# Patient Record
Sex: MALE | Race: WHITE
[De-identification: names, ages, dates, MRNs, and addresses within clinical notes are randomized per-mention and may not be internally consistent; named-entity substitution may affect disease eponyms.]

---

## 2018-11-17 ENCOUNTER — HOSPITAL ENCOUNTER (EMERGENCY)
Dept: HOSPITAL 97 - ER | Age: 28
Discharge: HOME | End: 2018-11-17
Payer: SELF-PAY

## 2018-11-17 DIAGNOSIS — R07.89: Primary | ICD-10-CM

## 2018-11-17 PROCEDURE — 71046 X-RAY EXAM CHEST 2 VIEWS: CPT

## 2018-11-17 PROCEDURE — 99283 EMERGENCY DEPT VISIT LOW MDM: CPT

## 2018-11-17 NOTE — ER
Nurse's Notes                                                                                     

 [facility]                                                                

Name: Poli Rangel                                                                             

Age: 28 yrs                                                                                       

Sex: Male                                                                                         

: 1990                                                                                   

MRN: [id number]                                                                                   

Arrival Date: 2018                                                                          

Time: 17:26                                                                                       

Account#: [id number]                                                                            

Bed 19                                                                                            

Private MD:                                                                                       

Diagnosis: Other chest pain-Right Lateral Chest Wall                                              

                                                                                                  

Presentation:                                                                                     

                                                                                             

17:48 Presenting complaint: Patient states: 5 days ago my right side rib was hurting and now  la1 

      this thing popped up and it hurts to touch or lay on. Transition of care: patient was       

      not received from another setting of care. Onset of symptoms was 2018.         

      Risk Assessment: Do you want to hurt yourself or someone else? Patient reports no           

      desire to harm self or others. Initial Sepsis Screen: Does the patient meet any 2           

      criteria? No. Patient's initial sepsis screen is negative. Does the patient have a          

      suspected source of infection? No. Patient's initial sepsis screen is negative. Care        

      prior to arrival: None.                                                                     

17:48 Method Of Arrival: Ambulatory                                                           la1 

17:48 Acuity: KAMERON 3                                                                           la1 

                                                                                                  

Historical:                                                                                       

- Allergies:                                                                                      

17:48 No Known Allergies;                                                                     la1 

- PMHx:                                                                                           

17:48 None;                                                                                   la1 

                                                                                                  

- Immunization history:: Adult Immunizations up to date.                                          

- Social history:: Smoking status: Patient/guardian denies using tobacco.                         

- Ebola Screening: : No symptoms or risks identified at this time.                                

                                                                                                  

                                                                                                  

Screenin:53 Abuse screen: Denies threats or abuse. Nutritional screening: No deficits noted.        em  

      Tuberculosis screening: No symptoms or risk factors identified. Fall Risk None              

      identified.                                                                                 

                                                                                                  

Assessment:                                                                                       

17:53 General: Appears in no apparent distress. comfortable, Behavior is calm, cooperative,   em  

      appropriate for age. Pain: Complains of pain in right lateral anterior chest Pain           

      currently is 7 out of 10 on a pain scale. at worst was 10 out of 10 on a pain scale.        

      Neuro: Level of Consciousness is awake, alert, obeys commands, Oriented to person,          

      place, time, situation. Cardiovascular: Capillary refill < 3 seconds Patient's skin is      

      warm and dry. Respiratory: Airway is patent Respiratory effort is even, unlabored,          

      Respiratory pattern is regular, symmetrical, Breath sounds are clear bilaterally.           

      tenderness to right lateral chest Denies cough, shortness of breath pain with               

      respiration. GI: Abdomen is flat, Abd is soft and non tender X 4 quads. : No signs        

      and/or symptoms were reported regarding the genitourinary system. EENT: No signs and/or     

      symptoms were reported regarding the EENT system. Derm: Skin is intact, Skin is pink,       

      warm \T\ dry. Musculoskeletal: Range of motion: intact in all extremities.                  

17:53 Reassessment: I agree with assessment completed by Feroz Del Rio LVN .                   aa5 

18:58 Reassessment: Patient appears in no apparent distress at this time. pending shot time,  em  

      will be discharged afterwards.                                                              

                                                                                                  

Vital Signs:                                                                                      

17:46  / 62; Pulse 73; Resp 18; Temp 97.2; Pulse Ox 100% on R/A; Weight 90.72 kg;       la1 

      Height 6 ft. 1 in. (185.42 cm);                                                             

18:58  / 68; Pulse 69; Resp 20; Pulse Ox 99% on R/A; Pain 7/10;                         em  

17:46 Body Mass Index 26.39 (90.72 kg, 185.42 cm)                                             la1 

                                                                                                  

ED Course:                                                                                        

17:26 Patient arrived in ED.                                                                  tw3 

17:46 Arm band placed on right wrist.                                                         la1 

17:48 Triage completed.                                                                       la1 

17:53 Feroz Del Rio LVN is Primary Nurse.                                                     em  

17:53 Patient has correct armband on for positive identification. Placed in gown. Bed in low  em  

      position. Call light in reach. Adult w/ patient.                                            

17:55 Preston Ledesma PA is PHCP.                                                                cp  

17:55 Larry Allen MD is Attending Physician.                                              cp  

18:33 XRAY Chest Pa And Lat (2 Views) In Process Unspecified.                                 EDMS

18:59 No provider procedures requiring assistance completed. Patient did not have IV access   em  

      during this emergency room visit.                                                           

                                                                                                  

Administered Medications:                                                                         

No medications were administered                                                                  

                                                                                                  

                                                                                                  

Outcome:                                                                                          

18:51 Discharge ordered by MD.                                                                cp  

18:59 Discharged to home ambulatory, with family.                                             em  

18:59 Condition: good                                                                             

18:59 Discharge instructions given to patient, Instructed on discharge instructions, follow       

      up and referral plans. medication usage, Demonstrated understanding of instructions,        

      follow-up care, medications, Prescriptions given X 1.                                       

19:11 Patient left the ED.                                                                    ak1 

                                                                                                  

Signatures:                                                                                       

Dispatcher [employer]                           EDMS                                                 

Feroz Del Rio LVN LVN  em                                                   

Mary Bhardwaj RN                     RN   aa5                                                  

Attema, Gilbert, RN                         RN   la1                                                  

Liset Webb RN                       RN   ak1                                                  

Preston Ledesma PA PA cp Wade, Tia                                    tw3                                                  

                                                                                                  

Corrections: (The following items were deleted from the chart)                                    

18:59 18:58  / 68; Pulse 19bpm; Resp 20bpm; Pulse Ox 99% RA; Pain 7/10; em              em  

                                                                                                  

**************************************************************************************************

## 2018-11-17 NOTE — RAD REPORT
EXAM DESCRIPTION:  RAD - Chest Pa And Lat (2 Views) - 11/17/2018 6:32 pm

 

CLINICAL HISTORY:  CHEST PAIN

Chest pain.

 

COMPARISON:  No comparisons

 

FINDINGS:  The lungs are clear. The heart is normal in size. No displaced fractures.

 

IMPRESSION:  No acute or concerning finding suspected.

## 2018-11-17 NOTE — EDPHYS
Physician Documentation                                                                           

 [facility]                                                                

Name: Poli Rangel                                                                             

Age: 28 yrs                                                                                       

Sex: Male                                                                                         

: 1990                                                                                   

MRN: [id number]                                                                                   

Arrival Date: 2018                                                                          

Time: 17:26                                                                                       

Account#: [id number]                                                                            

Bed 19                                                                                            

Private MD:                                                                                       

ED Physician Larry Allen                                                                       

HPI:                                                                                              

                                                                                             

18:15 This 28 yrs old  Male presents to ER via Ambulatory with complaints of Side    cp  

      Pain.                                                                                       

18:15 The patient or guardian reports chest pain that is located primarily in the anterior    cp  

      chest wall, right.                                                                          

18:15 The pain does not radiate. Associated signs and symptoms: Pertinent negatives:          cp  

      abdominal pain, cough, diaphoresis, lower extremity pain, lower extremity swelling,         

      recent travel, shortness of breath, syncope, vomiting.                                      

                                                                                                  

Historical:                                                                                       

- Allergies:                                                                                      

17:48 No Known Allergies;                                                                     la1 

- PMHx:                                                                                           

17:48 None;                                                                                   la1 

                                                                                                  

- Immunization history:: Adult Immunizations up to date.                                          

- Social history:: Smoking status: Patient/guardian denies using tobacco.                         

- Ebola Screening: : No symptoms or risks identified at this time.                                

                                                                                                  

                                                                                                  

ROS:                                                                                              

18:20 Constitutional: Negative for body aches, chills, fever, poor PO intake.                 cp  

18:20 Eyes: Negative for injury, pain, redness, and discharge.                                cp  

18:20 ENT: Negative for drainage from ear(s), ear pain, rhinorrhea, sore throat, difficulty       

      swallowing, difficulty handling secretions, hoarseness.                                     

18:20 Cardiovascular: Positive for chest pain, of the right lateral anterior chest wall,          

      Negative for edema, palpitations.                                                           

18:20 Respiratory: Negative for cough, dyspnea on exertion, shortness of breath, wheezing.        

18:20 Back: Negative for pain at rest, pain with movement, radiated pain.                         

18:20 : Negative for urinary symptoms.                                                          

18:20 Skin: Negative for cellulitis, rash.                                                        

18:20 Neuro: Negative for altered mental status, headache, weakness.                              

18:20 All other systems are negative.                                                             

                                                                                                  

Exam:                                                                                             

18:25 Constitutional: The patient appears in no acute distress, alert, awake,                 cp  

      non-diaphoretic, non-toxic, well developed, well nourished.                                 

18:25 Head/Face:  Normocephalic, atraumatic. Eyes:  Pupils equal round and reactive to light, cp  

      extra-ocular motions intact.  Lids and lashes normal.  Conjunctiva and sclera are           

      non-icteric and not injected.  Cornea within normal limits.  Periorbital areas with no      

      swelling, redness, or edema. ENT:  Nares patent. No nasal discharge, no septal              

      abnormalities noted.  Tympanic membranes are normal and external auditory canals are        

      clear.  Oropharynx with no redness, swelling, or masses, exudates, or evidence of           

      obstruction, uvula midline.  Mucous membranes moist.                                        

18:25 Neck: External neck: is normal, ROM/movement: is normal, is supple, without pain, no        

      range of motions limitations, no nuchal rigidity, Lymph nodes: no appreciated               

      lymphadenopathy.                                                                            

18:25 Chest/axilla: Inspection: normal, Palpation: crepitus, is not appreciated, tenderness,      

      that is moderate, of the  right lateral anterior chest, that totally reproduces the         

      patient's complaints.                                                                       

18:25 Cardiovascular: Rate: normal, Rhythm: regular, Pulses: Pulses are 2+ in right radial        

      artery and left radial artery. Edema: is not appreciated, JVD: is not appreciated.          

18:25 Respiratory: the patient does not display signs of respiratory distress,  Respirations:     

      normal, no use of accessory muscles, no retractions, no splinting, no tachypnea,            

      labored breathing, is not present, Breath sounds: are clear throughout, no decreased        

      breath sounds, no stridor, no wheezing.                                                     

18:25 Abdomen/GI: Inspection: abdomen appears normal, Palpation: abdomen is soft and              

      non-tender, in all quadrants.                                                               

18:25 Back: pain, is absent, ROM is normal.                                                       

18:25 Skin: cellulitis, is not appreciated, no rash present.                                      

18:25 Neuro: Orientation: is normal, Mentation: is normal, Cerebellar function: is grossly        

      normal, Motor: is normal, Gait: is steady.                                                  

                                                                                                  

Vital Signs:                                                                                      

17:46  / 62; Pulse 73; Resp 18; Temp 97.2; Pulse Ox 100% on R/A; Weight 90.72 kg;       la1 

      Height 6 ft. 1 in. (185.42 cm);                                                             

18:58  / 68; Pulse 69; Resp 20; Pulse Ox 99% on R/A; Pain 7/10;                         em  

17:46 Body Mass Index 26.39 (90.72 kg, 185.42 cm)                                             la1 

                                                                                                  

MDM:                                                                                              

17:55 Patient medically screened.                                                             cp  

18:00 Differential diagnosis: acute pericarditis, chest wall pain, pericarditis, pleurisy,    cp  

      pneumonia, pneumothorax, costochondritis.                                                   

18:50 Data reviewed: vital signs, nurses notes, radiologic studies, plain films.              cp  

18:50 Test interpretation: by ED physician or midlevel provider: plain radiologic studies.    cp  

      Counseling: I had a detailed discussion with the patient and/or guardian regarding: the     

      historical points, exam findings, and any diagnostic results supporting the                 

      discharge/admit diagnosis, radiology results, to return to the emergency department if      

      symptoms worsen or persist or if there are any questions or concerns that arise at          

      home. Response to treatment: the patient's symptoms have mildly improved after              

      treatment, and as a result, I will discharge patient.                                       

                                                                                                  

                                                                                             

18:07 Order name: XRAY Chest Pa And Lat (2 Views); Complete Time: 18:49                       cp  

                                                                                             

18:49 Interpretation: Report reviewed.                                                        cp  

                                                                                                  

Administered Medications:                                                                         

No medications were administered                                                                  

                                                                                                  

                                                                                                  

Disposition:                                                                                      

                                                                                             

09:53 Co-signature as Attending Physician, Larry Allen MD.                                    

                                                                                                  

Disposition:                                                                                      

18 18:51 Discharged to Home. Impression: Other chest pain - Right Lateral Chest Wall.       

- Condition is Stable.                                                                            

- Discharge Instructions: Chest Wall Pain.                                                        

- Prescriptions for ketorolac 10 mg Oral tablet - take 1 tablet by ORAL route every 6             

  hours As needed not to exceed 40 mg in 24hrs; 15 tablet.                                        

- Medication Reconciliation Form, Thank You Letter, Antibiotic Education, Prescription            

  Opioid Use form.                                                                                

- Follow up: Private Physician; When: 2 - 3 days; Reason: Recheck today's complaints.             

- Problem is new.                                                                                 

- Symptoms are unchanged.                                                                         

                                                                                                  

                                                                                                  

                                                                                                  

Signatures:                                                                                       

Dispatcher [employer]                           EDMS                                                 

Gilbert Estrella RN                         RN   la1                                                  

Liset Webb RN                       RN   ak1                                                  

Preston Ledesma PA PA cp Starr, Gregory, MD MD gs                                                   

                                                                                                  

Corrections: (The following items were deleted from the chart)                                    

                                                                                             

19:11 18:51 2018 18:51 Discharged to Home. Impression: Other chest pain - Right Lateral ak1 

      Chest Wall. Condition is Stable. Forms are Medication Reconciliation Form, Thank You        

      Letter, Antibiotic Education, Prescription Opioid Use. Follow up: Private Physician;        

      When: 2 - 3 days; Reason: Recheck today's complaints. Problem is new. Symptoms are          

      unchanged. cp                                                                               

                                                                                                  

**************************************************************************************************

## 2020-09-17 ENCOUNTER — HOSPITAL ENCOUNTER (EMERGENCY)
Dept: HOSPITAL 97 - ER | Age: 30
Discharge: HOME | End: 2020-09-17
Payer: SELF-PAY

## 2020-09-17 VITALS — SYSTOLIC BLOOD PRESSURE: 118 MMHG | DIASTOLIC BLOOD PRESSURE: 68 MMHG | OXYGEN SATURATION: 99 %

## 2020-09-17 VITALS — TEMPERATURE: 98.9 F
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
## 2020-09-17 DIAGNOSIS — Z23: ICD-10-CM

## 2020-09-17 DIAGNOSIS — S61.210A: Primary | ICD-10-CM
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
## 2020-09-17 PROCEDURE — 90471 IMMUNIZATION ADMIN: CPT

## 2020-09-17 PROCEDURE — 0JQJ0ZZ REPAIR RIGHT HAND SUBCUTANEOUS TISSUE AND FASCIA, OPEN APPROACH: ICD-10-PCS

## 2020-09-17 PROCEDURE — 90714 TD VACC NO PRESV 7 YRS+ IM: CPT

## 2020-09-17 PROCEDURE — 99283 EMERGENCY DEPT VISIT LOW MDM: CPT

## 2020-09-17 PROCEDURE — 64450 NJX AA&/STRD OTHER PN/BRANCH: CPT

## 2020-09-17 NOTE — XMS REPORT
Continuity of Care Document

                          Created on:2020



Patient:TERESA ROBLERO

Sex:Male

:1990

External Reference #:[id number]





Demographics







                          Address                   [address]



                                                    [place] [address]

 

                          Home Phone                [phone]

 

                          Work Phone                [phone]

 

                          Mobile Phone              [phone]

 

                          Email Address             DECL[place]

 

                          Preferred Language        English

 

                          Marital Status            Unknown

 

                          [employer] Affiliation     Unknown

 

                          Race                      Unknown

 

                          Additional Race(s)        Unavailable



                                                    White

 

                          Ethnic Group              Not  or 









Author







                          Organization              [facility]

t

 

                          Address                   [address]



                                                    [place] [address]

 

                          Phone                     [phone]









Support







                Name            Relationship    Address         Phone

 

                Unknown         Other           [phone]     [phone]



                                                [place] [address] 

 

                Juan Carlos         Mother          [phone]     [phone]



                                                [place] [address] 









Care Team Providers







                    Name                Role                Phone

 

                    Deloris ANDERS             Attending Clinician [phone]

 

                    Oswaldo RN,  A    Attending Clinician Unavailable

 

                    Doctor Unassigned,  Name Attending Clinician Unavailable

 

                    Deloris ANDERS             Admitting Clinician [phone]









Problems

This patient has no known problems.



Allergies, Adverse Reactions, Alerts

This patient has no known allergies or adverse reactions.



Medications

This patient has no known medications.



Procedures

This patient has no known procedures.



Encounters







        Start   End     Encounter Admission Attending Care    Care    Encounter 

Source



        Date/Time Date/Time Type    Type    Clinicians Facility Department ID   

   

 

        2019 [facility]         Saji Puentes [facility]    1.2.840.114 [id number]

[id number] 



        06:04:09 11:49:00 Encounter                 Health  350.1.13.10         



                                                Clear   4.2.7.2.686         



                                                [place]    [phone]         



                                                [facility] 049             



                                                ([facility])                   

 

        2019 Anesthesia         [facility]    [id number] 

[id number] 



        08:04:00 10:10:00                 [facility]  350.1.13.10         



                                                Clear   4.2.7.2.686         



                                                [place] [phone]         



                                                [facility] 020             



                                                ([facility])                   

 

        2019 Orders          Doctor SARAVIA    1.2.840.114 [id number]

[id number] 



        00:00:00 00:00:00 Only            UnassignedBOINTA   350.1.13.10       

  



                                        [facility] 4.2.7.2.686         



                                                        [phone]         



                                                        009             

 

        2019 Orders          Doctor  RANI    1.2.840.114 [id number]

[id number] 



        00:00:00 00:00:00 Only            Unassigned, BONITA   350.1.13.10       

  



                                        [facility] 4.2.7.2.686         



                                                        [phone]         



                                                        009             

 

        2019 Office          Saji Puentes [facility]    1.2.840.114 [id number]

[id number] 



        13:44:41 14:14:41 Visit                   Health  350.1.13.10         



                                                Clear   4.2.7.2.686         



                                                [place]    [phone]         



                                                [facility]             



                                                Office                  



                                                Building                 







Results

This patient has no known results.

## 2020-09-17 NOTE — ER
Nurse's Notes                                                                                     

 [facility]                                                                 

Name: Poli Rangel                                                                             

Age: 29 yrs                                                                                       

Sex: Male                                                                                         

: 1990                                                                                   

MRN: [id number]                                                                                   

Arrival Date: 2020                                                                          

Time: 10:17                                                                                       

Account#: [id number]                                                                            

Bed 7                                                                                             

Private MD: Nathaniel Hearn                                                                         

Diagnosis: Laceration without foreign body of right index finger without damage to nail           

                                                                                                  

Presentation:                                                                                     

                                                                                             

10:30 Chief complaint: Patient states: sharpening shovel and with file and finger got caught  em  

      on it lac. noted to the right index finger about inch. Coronavirus screen: Client           

      denies travel out of the [place] in the last 14 days. Ebola Screen: Patient negative for       

      fever greater than or equal to 101.5 degrees Fahrenheit, and additional compatible          

      Ebola Virus Disease symptoms Patient denies exposure to infectious person. Patient          

      denies travel to an Ebola-affected area in the 21 days before illness onset. No             

      symptoms or risks identified at this time. Initial Sepsis Screen: Does the patient meet     

      any 2 criteria? No. Patient's initial sepsis screen is negative. Does the patient have      

      a suspected source of infection? Yes: Skin breakdown/wound. Risk Assessment: Do you         

      want to hurt yourself or someone else? Patient reports no desire to harm self or            

      others. Onset of symptoms was 2020.                                           

10:30 Method Of Arrival: Ambulatory                                                           em  

10:30 Acuity: KAMERON 4                                                                           em  

                                                                                                  

Historical:                                                                                       

- Allergies:                                                                                      

10:33 No Known Allergies;                                                                     em  

- Home Meds:                                                                                      

10:33 None [Active];                                                                          em  

- PMHx:                                                                                           

10:33 None;                                                                                   em  

- PSHx:                                                                                           

10:33 back surgery;                                                                           em  

                                                                                                  

- Immunization history:: Adult Immunizations unknown.                                             

- Social history:: Smoking status: Patient denies any tobacco usage or history of.                

                                                                                                  

                                                                                                  

Screening:                                                                                        

10:30 Abuse screen: Denies threats or abuse. Nutritional screening: No deficits noted.        em  

      Tuberculosis screening: No symptoms or risk factors identified. Fall Risk None              

      identified.                                                                                 

                                                                                                  

Assessment:                                                                                       

10:49 General: Appears in no apparent distress. comfortable, Behavior is calm, cooperative,   ca1 

      appropriate for age. Pain: Complains of pain in dorsal aspect of middle phalanx of          

      right index finger. Neuro: Level of Consciousness is awake, alert, obeys commands,          

      Oriented to person, place, time, situation. Derm: Skin is healthy with good turgor,         

      Skin is pink, warm \T\ dry. Musculoskeletal: Circulation, motion, and sensation intact.     

      Capillary refill < 3 seconds. Injury Description: Laceration sustained to dorsal aspect     

      of middle phalanx of right index finger is clean, was sustained 30-60 minutes ago. a        

      small amount of bleeding noted at this time.                                                

11:24 Reassessment: Patient appears in no apparent distress at this time. Patient is alert,   ca1 

      oriented x 3, equal unlabored respirations, skin warm/dry/pink.                             

11:31 Reassessment: Daisha TOTH at bedside suturing.                                           sv  

                                                                                                  

Vital Signs:                                                                                      

10:30  / 73; Pulse 56; Resp 18; Temp 98.9; Pulse Ox 100% on R/A; Weight 88.45 kg;       em  

      Height 6 ft. 1 in. (185.42 cm); Pain 8/10;                                                  

11:50  / 68; Pulse 62; Resp 18; Pulse Ox 99% on R/A;                                    em  

10:30 Body Mass Index 25.73 (88.45 kg, 185.42 cm)                                             em  

                                                                                                  

ED Course:                                                                                        

10:17 Patient arrived in ED.                                                                  mr  

10:17 Nathaniel Hearn MD is Private Physician.                                                 mr  

10:17 [facility]C [facility]                                                        kb  

10:17 Av Pappas MD is Attending Physician.                                              kb  

10:30 Patient has correct armband on for positive identification. Bed in low position. Call   em  

      light in reach.                                                                             

10:33 Triage completed.                                                                       em  

10:33 Arm band placed on.                                                                     em  

10:40 Ghazala Barriga, RN is Primary Nurse.                                                      ca1 

10:49 Pulse ox on. NIBP on.                                                                   ca1 

10:49 Patient did not have IV access during this emergency room visit.                        ca1 

10:50 Assist provider with laceration repair on dorsal aspect of middle phalanx of right      em  

      index finger that was between 2.6 to 7.5 cm using sutures. Set up tray. Performed by        

      Daisha CALDWELL Dressed with 4X4s, Kerlix, Neosporin, Patient tolerated well.         

                                                                                                  

Administered Medications:                                                                         

10:44 Drug: Tetanus-Diphtheria Toxoid Adult 0.5 ml {: [place]. Exp:         em  

      2023. Lot #: [id number]. } Route: IM; Site: left deltoid;                                  

12:02 Follow up: Response: No adverse reaction                                                em  

10:48 Drug: Lidocaine (1 %) 1 vials {Note: by SIMI Ashton} Volume: 5 ml; Route: Infiltration;ca1 

10:49 Drug: Bupivacaine (0.5 %) 1 vials {Note: by SIMI Ashton} Volume: 10 ml; Route:         ca1 

      Infiltration;                                                                               

                                                                                                  

                                                                                                  

Outcome:                                                                                          

11:43 Discharge ordered by MD. ospina  

12:01 Discharged to home ambulatory.                                                          em  

12:01 Condition: good                                                                             

12:01 Discharge instructions given to patient, Instructed on discharge instructions, follow       

      up and referral plans. wound care, Demonstrated understanding of instructions,              

      follow-up care, wound care.                                                                 

12:03 Patient left the ED.                                                                    em  

                                                                                                  

Signatures:                                                                                       

Daisha Lemon, FNP-C                 FNP-Mariann Acevedo, RN                    Mary Sultana Edgar, RN                        RN                                                      

Ghazala Barriga RN                        RN   ca1                                                  

                                                                                                  

**************************************************************************************************

## 2020-09-17 NOTE — EDPHYS
Physician Documentation                                                                           

 [facility]                                                                 

Name: Poli Rangel                                                                             

Age: 29 yrs                                                                                       

Sex: Male                                                                                         

: 1990                                                                                   

MRN: [id number]                                                                                   

Arrival Date: 2020                                                                          

Time: 10:17                                                                                       

Account#: [id number]                                                                            

Bed 7                                                                                             

Private MD: Nathaniel Hearn                                                                         

ED Physician Av Pappas                                                                       

HPI:                                                                                              

                                                                                             

10:46 This 29 yrs old  Male presents to ER via Ambulatory with complaints of Finger  kb  

      laceration.                                                                                 

10:56 The patient has a laceration related to: doing yard work, sharpening shovel occurred    kb  

      outdoors, and there are no complicating factors. The injury was accidental. The             

      laceration(s) is(are) located on the dorsal aspect of proximal phalanx of right index       

      finger. Onset: The symptoms/episode began/occurred just prior to arrival. Associated        

      signs and symptoms: The patient has no apparent associated signs or symptoms. The           

      patient has not experienced similar symptoms in the past. The patient has not recently      

      seen a physician.                                                                           

                                                                                                  

Historical:                                                                                       

- Allergies:                                                                                      

10:33 No Known Allergies;                                                                     em  

- Home Meds:                                                                                      

10:33 None [Active];                                                                          em  

- PMHx:                                                                                           

10:33 None;                                                                                   em  

- PSHx:                                                                                           

10:33 back surgery;                                                                           em  

                                                                                                  

- Immunization history:: Adult Immunizations unknown.                                             

- Social history:: Smoking status: Patient denies any tobacco usage or history of.                

                                                                                                  

                                                                                                  

ROS:                                                                                              

10:45 Constitutional: Negative for fever, chills, and weight loss, Cardiovascular: Negative   kb  

      for chest pain, palpitations, and edema, Respiratory: Negative for shortness of breath,     

      cough, wheezing, and pleuritic chest pain, Abdomen/GI: Negative for abdominal pain,         

      nausea, vomiting, diarrhea, and constipation, Back: Negative for injury and pain,           

      MS/Extremity: Negative for injury and deformity, Neuro: Negative for headache,              

      weakness, numbness, tingling, and seizure.                                                  

10:45 Skin: Positive for laceration(s), of the dorsal aspect of middle phalanx of right index     

      finger.                                                                                     

                                                                                                  

Exam:                                                                                             

10:53 Constitutional:  This is a well developed, well nourished patient who is awake, alert,  kb  

      and in no acute distress. Head/Face:  Normocephalic, atraumatic. Chest/axilla:  Normal      

      chest wall appearance and motion.  Nontender with no deformity.  No lesions are             

      appreciated. Cardiovascular:  Regular rate and rhythm with a normal S1 and S2.  No          

      gallops, murmurs, or rubs.  Normal PMI, no JVD.  No pulse deficits. Respiratory:  Lungs     

      have equal breath sounds bilaterally, clear to auscultation and percussion.  No rales,      

      rhonchi or wheezes noted.  No increased work of breathing, no retractions or nasal          

      flaring. Abdomen/GI:  Soft, non-tender, with normal bowel sounds.  No distension or         

      tympany.  No guarding or rebound.  No evidence of tenderness throughout. MS/ Extremity:     

       Pulses equal, no cyanosis.  Neurovascular intact.  Full, normal range of motion.           

      Neuro:  Awake and alert, GCS 15, oriented to person, place, time, and situation.            

      Cranial nerves II-XII grossly intact.  Motor strength 5/5 in all extremities.  Sensory      

      grossly intact.  Cerebellar exam normal.  Normal gait.                                      

10:53 Skin: injury, laceration(s), the wound is approximately  2 cm(s), of the dorsal aspect      

      of proximal phalanx of right index finger, that can be described as clean, no foreign       

      body, linear, without bleeding.                                                             

                                                                                                  

Vital Signs:                                                                                      

10:30  / 73; Pulse 56; Resp 18; Temp 98.9; Pulse Ox 100% on R/A; Weight 88.45 kg;       em  

      Height 6 ft. 1 in. (185.42 cm); Pain 8/10;                                                  

11:50  / 68; Pulse 62; Resp 18; Pulse Ox 99% on R/A;                                    em  

10:30 Body Mass Index 25.73 (88.45 kg, 185.42 cm)                                             em  

                                                                                                  

Procedures:                                                                                       

10:53 Nerve block: (digital) of dorsal aspect of proximal phalanx of right index finger       kb  

      Medication: Lidocaine 1% without epinephrine Marcaine 0.5%, Amount: 5 mls were              

      injected, Effect: the patient has resolution of the pain, Set up for procedure.             

      Performed by Daisha CALDWELL Patient tolerated well.                                  

                                                                                                  

Laceration:                                                                                       

11:42 Wound Repair of 2cm ( 0.8in ) subcutaneous laceration to dorsal aspect of proximal      kb  

      phalanx of right index finger. Linear shaped.. Distal neuro/vascular/tendon intact.         

      Anesthesia: Digital block administered with 1% lidocaine. Wound prep: Extensive             

      cleansing with hibiclenz by me, Wound irrigation with saline by me. Skin closed with 4      

      5-0 Prolene using simple sutures and sterile technique. Patient tolerated well.             

                                                                                                  

MDM:                                                                                              

10:36 Patient medically screened.                                                             kb  

10:46 Data reviewed: vital signs, nurses notes. Data interpreted: Pulse oximetry: on room air kb  

      is 100 %. Interpretation: normal. Counseling: I had a detailed discussion with the          

      patient and/or guardian regarding: the historical points, exam findings, and any            

      diagnostic results supporting the discharge/admit diagnosis, the need for outpatient        

      follow up, a family practitioner, to return to the emergency department if symptoms         

      worsen or persist or if there are any questions or concerns that arise at home.             

                                                                                                  

                                                                                             

10:40 Order name: Prolene, Sutures; Complete Time: 10:49                                      kb  

                                                                                             

10:40 Order name: Dressing - Wound; Complete Time: 11:59                                      kb  

                                                                                             

10:40 Order name: Gloves, Sterile; Complete Time: 10:40                                       kb  

                                                                                             

10:40 Order name: Setup Suture Tray; Complete Time: 10:40                                     kb  

                                                                                             

11:42 Order name: Finger Splint; Complete Time: 11:59                                         kb  

                                                                                                  

Administered Medications:                                                                         

10:44 Drug: Tetanus-Diphtheria Toxoid Adult 0.5 ml {: [employer]. Exp:         em  

      2023. Lot #: [id number]. } Route: IM; Site: left deltoid;                                  

12:02 Follow up: Response: No adverse reaction                                                em  

10:48 Drug: Lidocaine (1 %) 1 vials {Note: by JANEY Ashton.} Volume: 5 ml; Route: Infiltration;ca1 

10:49 Drug: Bupivacaine (0.5 %) 1 vials {Note: by Daisha NP.} Volume: 10 ml; Route:         ca1 

      Infiltration;                                                                               

                                                                                                  

                                                                                                  

Disposition:                                                                                      

12:31 Co-signature as Attending Physician, Av Pappas MD I agree with the assessment and   kdr 

      plan of care.                                                                               

                                                                                                  

Disposition:                                                                                      

20 11:43 Discharged to Home. Impression: Laceration without foreign body of right index     

  finger without damage to nail.                                                                  

- Condition is Stable.                                                                            

- Discharge Instructions: Laceration Care, Adult, Easy-to-Read.                                   

                                                                                                  

- Medication Reconciliation Form, Thank You Letter, Antibiotic Education, Prescription            

  Opioid Use form.                                                                                

- Follow up: Emergency Department; When: As needed; Reason: Worsening of condition.               

  Follow up: Private Physician; When: 2 - 3 days; Reason: Recheck today's complaints,             

  Continuance of care, Re-evaluation by your physician.                                           

- Notes: Have sutures removed in 10-14 days Keep clean and dry                                    

                                                                                                  

                                                                                                  

Signatures:                                                                                       

Daisha Lemon, JANESC                 FNP-Av Robledo MD MD kdr Munoz, Edgar, RN                        RN   em                                                   

Ghazala Barriga RN                        RN   ca1                                                  

                                                                                                  

Corrections: (The following items were deleted from the chart)                                    

12:03 11:43 2020 11:43 Discharged to Home. Impression: Laceration without foreign body  em  

      of right index finger without damage to nail. Condition is Stable. Forms are Medication     

      Reconciliation Form, Thank You Letter, Antibiotic Education, Prescription Opioid Use.       

      Follow up: Emergency Department; When: As needed; Reason: Worsening of condition.           

      Follow up: Private Physician; When: 2 - 3 days; Reason: Recheck today's complaints,         

      Continuance of care, Re-evaluation by your physician. kb                                    

                                                                                                  

**************************************************************************************************

## 2021-04-23 ENCOUNTER — HOSPITAL ENCOUNTER (EMERGENCY)
Dept: HOSPITAL 97 - ER | Age: 31
LOS: 1 days | Discharge: HOME | End: 2021-04-24
Payer: SELF-PAY

## 2021-04-23 DIAGNOSIS — Z20.822: ICD-10-CM

## 2021-04-23 DIAGNOSIS — B34.8: Primary | ICD-10-CM

## 2021-04-23 LAB
ALBUMIN SERPL BCP-MCNC: 4 G/DL (ref 3.4–5)
ALP SERPL-CCNC: 87 U/L (ref 45–117)
ALT SERPL W P-5'-P-CCNC: 29 U/L (ref 12–78)
AST SERPL W P-5'-P-CCNC: 18 U/L (ref 15–37)
BUN BLD-MCNC: 13 MG/DL (ref 7–18)
GLUCOSE SERPLBLD-MCNC: 96 MG/DL (ref 74–106)
HCT VFR BLD CALC: 40.9 % (ref 39.6–49)
LYMPHOCYTES # SPEC AUTO: 1.6 K/UL (ref 0.7–4.9)
PMV BLD: 8 FL (ref 7.6–11.3)
POTASSIUM SERPL-SCNC: 3.5 MMOL/L (ref 3.5–5.1)
RBC # BLD: 4.29 M/UL (ref 4.33–5.43)
SARS-COV-2 RNA RESP QL NAA+PROBE: NEGATIVE
SPECIMEN VOL FLD: 6 ML

## 2021-04-23 PROCEDURE — 87081 CULTURE SCREEN ONLY: CPT

## 2021-04-23 PROCEDURE — 0240U: CPT

## 2021-04-23 PROCEDURE — 89050 BODY FLUID CELL COUNT: CPT

## 2021-04-23 PROCEDURE — 80053 COMPREHEN METABOLIC PANEL: CPT

## 2021-04-23 PROCEDURE — 36415 COLL VENOUS BLD VENIPUNCTURE: CPT

## 2021-04-23 PROCEDURE — 85025 COMPLETE CBC W/AUTO DIFF WBC: CPT

## 2021-04-23 PROCEDURE — 99284 EMERGENCY DEPT VISIT MOD MDM: CPT

## 2021-04-23 PROCEDURE — 84157 ASSAY OF PROTEIN OTHER: CPT

## 2021-04-23 PROCEDURE — 86308 HETEROPHILE ANTIBODY SCREEN: CPT

## 2021-04-23 PROCEDURE — 82945 GLUCOSE OTHER FLUID: CPT

## 2021-04-23 PROCEDURE — 70450 CT HEAD/BRAIN W/O DYE: CPT

## 2021-04-23 PROCEDURE — 87070 CULTURE OTHR SPECIMN AEROBIC: CPT

## 2021-04-23 PROCEDURE — 62270 DX LMBR SPI PNXR: CPT

## 2021-04-23 NOTE — XMS REPORT
Continuity of Care Document

                            Created on:2021



Patient:TERESA ROBLERO

Sex:Male

:1990

External Reference #:[id number]





Demographics







                          Address                   [address]



                                                    [place] [address]

 

                          Home Phone                [phone]

 

                          Work Phone                [phone]

 

                          Mobile Phone              [phone]

 

                          Email Address             [address]

 

                          Preferred Language        English

 

                          Marital Status            Unknown

 

                          [employer] Affiliation     Unknown

 

                          Race                      Unknown

 

                          Additional Race(s)        Unavailable



                                                    White

 

                          Ethnic Group              Unknown









Author







                          Organization              [facility]

t

 

                          Address                   [address]



                                                    [place] [address]

 

                          Phone                     [phone]









Support







                Name            Relationship    Address         Phone

 

                Unknown         Other           [address]     [phone]



                                                [place] [address] 

 

                Juan Carlos         Mother          [phone]     [phone]



                                                [place] [address] 









Care Team Providers







                    Name                Role                Phone

 

                    Deloris ANDERS             Attending Clinician [phone]

 

                    Oswaldo RN,  A    Attending Clinician Unavailable

 

                    Doctor Unassigned,  Name Attending Clinician Unavailable

 

                    Deloris ANDERS             Admitting Clinician [phone]









Problems

This patient has no known problems.



Allergies, Adverse Reactions, Alerts

This patient has no known allergies or adverse reactions.



Medications

This patient has no known medications.



Procedures

This patient has no known procedures.



Encounters







        Start   End     Encounter Admission Attending Care    Care    Encounter 

Source



        Date/Time Date/Time Type    Type    Clinicians Facility Department ID   

   

 

        2019 Hospital         [facility]    [id number] [id number]

[id number] 



        06:04:09 11:49:00 Encounter                 Health  350.1.13.10         



                                                [facility]   [id number]         



                                                [place]    [phone]         



                                                [facility] 049             



                                                ([facility])                   

 

        2019 Anesthesia         [facility]    [id number] 

[id number] 



        08:04:00 10:10:00                 [facility]  350.1.13.10         



                                                [facility]   4.2.7.2.686         



                                                [place]    [phone]         



                                                [facility] 020             



                                                ([facility])                   

 

        2019 Orders          Doctor SARAVIA    1.2.840.114 [id number]

[id number] 



        00:00:00 00:00:00 Only            UnassignedBONITA   350.1.13.10       

  



                                        [facility] 4.2.7.2.686         



                                                        [phone]         



                                                        009             

 

        2019 Orders          Doctor  RANI    1.2.840.114 [id number]

[id number] 



        00:00:00 00:00:00 Only            UnassignedBONITA   350.1.13.10       

  



                                        [facility] 4.2.7.2.686         



                                                        [phone]         



                                                        009             

 

        2019 Office          Saji Puentes [facility]    [id number] [id number]

[id number] 



        13:44:41 14:14:41 Visit                   Health  350.1.13.10         



                                                [place]   4.2.7.2.686         



                                                [place]    [phone]         



                                                Medical [address]             



                                                Office                  



                                                Building                 







Results

This patient has no known results.

## 2021-04-24 VITALS — TEMPERATURE: 100 F | SYSTOLIC BLOOD PRESSURE: 136 MMHG | DIASTOLIC BLOOD PRESSURE: 78 MMHG | OXYGEN SATURATION: 99 %

## 2021-04-24 NOTE — EDPHYS
Physician Documentation                                                                           

 [facility]                                                                 

Name: Poli Rangel                                                                             

Age: 30 yrs                                                                                       

Sex: Male                                                                                         

: 1990                                                                                   

MRN: [id number]                                                                                   

Arrival Date: 2021                                                                          

Time: 19:03                                                                                       

Account#: [id number]                                                                            

Bed 26                                                                                            

Private MD:                                                                                       

ED Physician Lauro Guerrero                                                                      

HPI:                                                                                              

                                                                                             

21:25 This 30 yrs old  Male presents to ER via Ambulatory with complaints of Fever,  jmm 

      Sore Throat, Neck Problem.                                                                  

21:25 The patient presents with sore throat. Onset: The symptoms/episode began/occurred       jmm 

      gradually, 1 day(s) ago. Modifying factors: The symptoms are alleviated by nothing, the     

      symptoms are aggravated by. Associated signs and symptoms: Pertinent positives: fever,      

      neck pain. This is a 30 year old male with no chronic medical conditions that presents      

      to the ED with complaints of sore throat, fever, with neck stiffness. Patient denies        

      cough, vomiting, shortness of breath. .                                                     

                                                                                                  

Historical:                                                                                       

- Allergies:                                                                                      

19:14 No Known Allergies;                                                                     rr5 

- Home Meds:                                                                                      

19:14 None [Active];                                                                          rr5 

- PMHx:                                                                                           

19:14 None;                                                                                   rr5 

- PSHx:                                                                                           

19:14 back surgery;                                                                           rr5 

                                                                                                  

- Immunization history:: Adult Immunizations up to date.                                          

- Social history:: Smoking status: Reported history of juuling and/or vaping.                     

  Patient/guardian denies using alcohol, street drugs.                                            

                                                                                                  

                                                                                                  

ROS:                                                                                              

21:25 Constitutional: Negative for fever, chills, and weight loss, Cardiovascular: Negative   jmm 

      for chest pain, palpitations, and edema, Respiratory: Negative for shortness of breath,     

      cough, wheezing, and pleuritic chest pain.                                                  

21:25 Neuro: Positive for headache.                                                               

21:25 All other systems are negative.                                                             

                                                                                                  

Exam:                                                                                             

21:25 Constitutional:  This is a well developed, well nourished patient who is awake, alert,  jmm 

      and in no acute distress. Head/Face:  atraumatic. Eyes:  EOMI, no conjunctival erythema     

      appreciated ENT:  Moist Mucus Membranes                                                     

21:25 Chest/axilla:  Normal chest wall appearance and motion.   Cardiovascular:  Regular rate     

      and rhythm.  No edema appreciated Respiratory:  Normal respirations, no respiratory         

      distress appreciated Abdomen/GI:  Non distended, soft Back:  Normal ROM Skin:  General      

      appearance color normal MS/ Extremity:  Moves all extremities, no obvious deformities       

      appreciated, no edema noted to the lower extremities  Neuro:  Awake and alert, normal       

      gait Psych:  Behavior is normal, Mood is normal, Patient is cooperative and pleasant        

21:25 Neck: pain on flexion.                                                                      

                                                                                                  

Vital Signs:                                                                                      

19:09  / 78; Pulse 76; Resp 19; Temp 100; Pulse Ox 99% ; Weight 85.73 kg; Height 6 ft.  rr5 

      1 in. (185.42 cm); Pain 6/10;                                                               

                                                                                             

01:08  / 68; Pulse 54; Resp 16; Pulse Ox 99% on R/A;                                    jm8 

                                                                                             

19:09 Body Mass Index 24.94 (85.73 kg, 185.42 cm)                                             rr5 

                                                                                                  

MDM:                                                                                              

                                                                                             

21:00 Patient medically screened.                                                             [facility] 

                                                                                             

00:53 Data reviewed: vital signs, nurses notes. Counseling: I had a detailed discussion with  dee dee 

      the patient and/or guardian regarding: the historical points, exam findings, and any        

      diagnostic results supporting the discharge/admit diagnosis, lab results, radiology         

      results, the need for outpatient follow up, to return to the emergency department if        

      symptoms worsen or persist or if there are any questions or concerns that arise at          

      home. ED course: Patient is alert and non toxic in appearance in the ED. No signs of        

      sepsis. LP is normal. Advised to follow up with pcp and otherwise given strict return       

      precautions. patient understood and agrees with the plan of care. .                         

                                                                                                  

                                                                                             

20:26 Order name: Strep; Complete Time: 21:28                                                 [facility] 

                                                                                             

20:26 Order name: Flu                                                                         [facility] 

                                                                                             

21:08 Order name: CBC with Diff; Complete Time: 22:21                                         [facility] 

                                                                                             

21:08 Order name: CMP; Complete Time: 22:26                                                   [facility] 

                                                                                             

21:08 Order name: Mono Screen Profile; Complete Time: 22:26                                   [facility] 

                                                                                             

21:08 Order name: CT Head Brain wo Cont                                                       [facility] 

                                                                                             

21:27 Order name: Throat Culture                                                              [facility]

                                                                                             

21:28 Order name: Csf Culture                                                                 [facility] 

                                                                                             

21:28 Order name: Fluid Cell Count,Body; Complete Time: 00:55                                 [facility] 

                                                                                             

21:28 Order name: Spinal Fluid Profile; Complete Time: 00:47                                  [facility] 

                                                                                             

21:47 Order name: COVID-19/FLU A+B; Complete Time: 21:47                                      [facility]

                                                                                             

21:08 Order name: Saline Lock; Complete Time: 22:10                                           [facility] 

                                                                                             

21:28 Order name: LP Consents; Complete Time: 22:41                                           [facility] 

                                                                                             

21:28 Order name: LP Setup; Complete Time: 22:41                                              [facility] 

                                                                                                  

Administered Medications:                                                                         

                                                                                             

22:10 Drug: NS 0.9% 1000 ml Route: IV; Rate: 1 bolus; Site: left antecubital;                 [facility] 

22:10 Drug: Motrin (ibuprofen) 800 mg Route: PO;                                              [facility] 

23:26 Follow up: Response: No adverse reaction                                                [facility] 

                                                                                                  

                                                                                                  

Disposition:                                                                                      

                                                                                             

03:32 Co-signature as Attending Physician, Lauro Guerrero MD.                                 [facility] 

                                                                                                  

Disposition:                                                                                      

21 00:54 Discharged to Home. Impression: Headache, Other viral infections of unspecified    

  site.                                                                                           

- Condition is Stable.                                                                            

- Discharge Instructions: Lumbar Puncture, Pharyngitis, Lumbar Puncture, Care After.              

                                                                                                  

- Medication Reconciliation Form, Thank You Letter, Antibiotic Education, Prescription            

  Opioid Use form.                                                                                

- Follow up: Private Physician; When: 2 - 3 days; Reason: Recheck today's complaints,             

  Continuance of care, Re-evaluation by your physician.                                           

                                                                                                  

                                                                                                  

                                                                                                  

Signatures:                                                                                       

Dispatcher [employer]                           [facility]                                                 

Hung Sheikh PA PA   [facility]                                                  

Portia Gardner RN RN                                                      

Danny Castaneda RN                      RN   rr5                                                  

Lauro Guerrero MD MD   [facility]                                                  

Kaleb Knight RN                     RN   8                                                  

                                                                                                  

Corrections: (The following items were deleted from the chart)                                    

                                                                                             

20:54 20:27 CORONAVIRUS+MR.LAB.BRZ ordered. [facility]

                                                                                             

01:08 00:54 2021 00:54 Discharged to Home. Impression: Headache; Other viral infections iw  

      of unspecified site. Condition is Stable. Forms are Medication Reconciliation Form,         

      Thank You Letter, Antibiotic Education, Prescription Opioid Use. Follow up: Private         

      Physician; When: 2 - 3 days; Reason: Recheck today's complaints, Continuance of care,       

      Re-evaluation by your physician. [facility]                                                        

                                                                                                  

**************************************************************************************************

## 2021-04-24 NOTE — RAD REPORT
EXAM DESCRIPTION:  CT HEAD WITHOUT IV CONTRAST

 

CLINICAL HISTORY:  Fever, headache

 

TECHNIQUE:  Contiguous axial CT images obtained through the brain without IV contrast. Coronal and sa
gittal reformatted images were provided.

This exam was performed according to our departmental dose-optimization program, which includes autom
ated exposure control, adjustment of the mA and/or kV according to patient size and/or use of iterati
ve reconstruction technique.

 

COMPARISON:  None available for comparison

 

FINDINGS:  Brain: No significant white matter changes. No focal mass effect. Gray-white matter differ
entiation is within normal limits. No hemorrhage.

Ventricles: No ventriculomegaly or midline shift.

Extra-axial spaces: No extra-axial collection or hemorrhage.

Paranasal sinuses and mastoid air cells: Well-aerated

Vessels: Unremarkable

Bones: 1.2 cm lesion with peripheral sclerotic rim, central lucency and focal central mineralization 
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 No cortical disruption or per
iosteal reaction.

Soft tissues: Unremarkable

 

IMPRESSION:  1.   No acute intracranial or extra-axial abnormality.

2.   Other findings as above.

 

Electronically signed by:   Aleena Marino MD   4/23/2021 10:12 PM CDT Workstation: [id number]

 

 

Due to temporary technical issues with the PACS/Fluency reporting system, reports are being signed by
 the in house radiologists without review as a courtesy to insure prompt reporting. The interpreting 
radiologist is fully responsible for the content of the report.

## 2021-04-24 NOTE — ER
Nurse's Notes                                                                                     

 [facility]                                                                 

Name: Poli Rangel                                                                             

Age: 30 yrs                                                                                       

Sex: Male                                                                                         

: 1990                                                                                   

MRN: [id number]                                                                                   

Arrival Date: 2021                                                                          

Time: 19:03                                                                                       

Account#: [id number]                                                                            

Bed 26                                                                                            

Private MD:                                                                                       

Diagnosis: Headache;Other viral infections of unspecified site                                    

                                                                                                  

Presentation:                                                                                     

                                                                                             

19:09 Chief complaint: Patient states: neck pain, stiffness started yesterday then the fever  rr5 

      started 2 days. I noticed too my neck glands is swollen and having sore throat.             

      Coronavirus screen: Client denies travel out of the [place] in the last 14 days. fever,        

      Client presents with at least one sign or symptom that may indicate coronavirus-19.         

      Standard/surgical mask placed on the client. Provider contacted for isolation               

      considerations. Ebola Screen: Patient negative for fever greater than or equal to 101.5     

      degrees Fahrenheit, and additional compatible Ebola Virus Disease symptoms Patient          

      denies exposure to infectious person. Patient denies travel to an Ebola-affected area       

      in the 21 days before illness onset. Initial Sepsis Screen: Does the patient meet any 2     

      criteria? No. Patient's initial sepsis screen is negative. Does the patient have a          

      suspected source of infection? No. Patient's initial sepsis screen is negative. Risk        

      Assessment: Do you want to hurt yourself or someone else? Patient reports no desire to      

      harm self or others. Onset of symptoms was 2021.                                  

19:09 Method Of Arrival: Ambulatory                                                           rr5 

19:09 Acuity: KAMERON 3                                                                           rr5 

                                                                                                  

Historical:                                                                                       

- Allergies:                                                                                      

19:14 No Known Allergies;                                                                     rr5 

- Home Meds:                                                                                      

19:14 None [Active];                                                                          rr5 

- PMHx:                                                                                           

19:14 None;                                                                                   rr5 

- PSHx:                                                                                           

19:14 back surgery;                                                                           rr5 

                                                                                                  

- Immunization history:: Adult Immunizations up to date.                                          

- Social history:: Smoking status: Reported history of juuling and/or vaping.                     

  Patient/guardian denies using alcohol, street drugs.                                            

                                                                                                  

                                                                                                  

Screenin:29 Abuse screen: Denies threats or abuse. Denies injuries from another. Nutritional        jm8 

      screening: No deficits noted. Tuberculosis screening: No symptoms or risk factors           

      identified. Fall Risk None identified.                                                      

                                                                                                  

Assessment:                                                                                       

22:26 General: Appears in no apparent distress. Behavior is calm, cooperative, appropriate    jm8 

      for age, Reports chills for fever for. Pain: Complains of pain in neck. Pain: Pain          

      currently is 8 out of 10 on a pain scale. Aggravated by increased activity, Also            

      complains of no other associated symptoms. Neuro: No deficits noted. Neuro: Level of        

      Consciousness is awake, alert, obeys commands, Oriented to person, place, time.             

      Cardiovascular: No deficits noted. Respiratory: Reports fever and neck pain Airway is       

      patent Trachea midline Respiratory effort is even, unlabored, Breath sounds are clear.      

      GI: No deficits noted. : No deficits noted. EENT: No deficits noted. Throat has           

      patchy exudate. Derm: No deficits noted. Musculoskeletal: No deficits noted.                

                                                                                                  

Vital Signs:                                                                                      

19:09  / 78; Pulse 76; Resp 19; Temp 100; Pulse Ox 99% ; Weight 85.73 kg; Height 6 ft.  rr5 

      1 in. (185.42 cm); Pain 6/10;                                                               

                                                                                             

01:08  / 68; Pulse 54; Resp 16; Pulse Ox 99% on R/A;                                    jm8 

                                                                                             

19:09 Body Mass Index 24.94 (85.73 kg, 185.42 cm)                                             rr5 

                                                                                                  

ED Course:                                                                                        

                                                                                             

19:03 Patient arrived in ED.                                                                  mr  

19:13 Triage completed.                                                                       rr5 

19:14 Arm band placed on right wrist.                                                         rr5 

20:37 Hung Sheikh PA is PHCP.                                                              jm 

20:37 Lauro Guerrero MD is Attending Physician.                                             jmm 

21:47 CT Head Brain wo Cont In Process Unspecified.                                           EDMS

22:01 Initial lab(s) drawn, by me, sent to lab. Inserted saline lock: 20 gauge in left        iw  

      antecubital area, using aseptic technique. Blood collected.                                 

22:29 Patient has correct armband on for positive identification. Bed in low position. Call   jm8 

      light in reach. Side rails up X 1. Adult w/ patient.                                        

22:45 Assist provider with lumbar puncture: Set up LP tray. Performed by Hung CABRERA CSF  jm8 

      is clear. Puncture site dressed with band aid, Procedure was successful. Patient            

      tolerated well.                                                                             

                                                                                             

00:42 Portia Gardner, RN is Primary Nurse.                                                   iw  

01:07 IV discontinued, intact, bleeding controlled, No redness/swelling at site. Pressure     iw  

      dressing applied.                                                                           

                                                                                                  

Administered Medications:                                                                         

                                                                                             

22:10 Drug: NS 0.9% 1000 ml Route: IV; Rate: 1 bolus; Site: left antecubital;                 jm8 

22:10 Drug: Motrin (ibuprofen) 800 mg Route: PO;                                              8 

23:26 Follow up: Response: No adverse reaction                                                jm8 

                                                                                                  

                                                                                                  

Outcome:                                                                                          

                                                                                             

00:54 Discharge ordered by MD.                                                                dee dee 

01:07 Discharged to home ambulatory, with family.                                             iw  

01:07 Condition: good                                                                             

01:07 Discharge instructions given to patient, family, Instructed on discharge instructions,  iw  

      follow up and referral plans. Demonstrated understanding of instructions, follow-up         

      care.                                                                                       

01:08 Patient left the ED.                                                                    iw  

                                                                                                  

Signatures:                                                                                       

Dispatcher [employer]                           EDMS                                                 

Hung Sheikh PA PA   jmMary Mendoza                                 mr                                                   

Portia Gardner, RN                     RN   iw                                                   

Danny Castaneda, RN                      RN   rr5                                                  

Kaleb Knight RN                     RN   jm8                                                  

                                                                                                  

Corrections: (The following items were deleted from the chart)                                    

                                                                                             

22:30 22:26 Respiratory: Reports fever and neck pain Airway is patent Trachea midline Breath  jm8 

      sounds are clear jm8                                                                        

                                                                                                  

**************************************************************************************************

## 2021-09-11 ENCOUNTER — HOSPITAL ENCOUNTER (EMERGENCY)
Dept: HOSPITAL 97 - ER | Age: 31
LOS: 1 days | Discharge: HOME | End: 2021-09-12
Payer: SELF-PAY

## 2021-09-11 DIAGNOSIS — S01.81XA: Primary | ICD-10-CM
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
## 2021-09-11 PROCEDURE — 99284 EMERGENCY DEPT VISIT MOD MDM: CPT

## 2021-09-11 PROCEDURE — 70486 CT MAXILLOFACIAL W/O DYE: CPT

## 2021-09-11 PROCEDURE — 70450 CT HEAD/BRAIN W/O DYE: CPT

## 2021-09-11 PROCEDURE — 72125 CT NECK SPINE W/O DYE: CPT

## 2021-09-11 PROCEDURE — 76377 3D RENDER W/INTRP POSTPROCES: CPT
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
## 2021-09-11 NOTE — XMS REPORT
Continuity of Care Document

                          Created on:2021



Patient:TERESA ROBLERO

Sex:Male

:1990

External Reference #:[id number]





Demographics







                          Address                   [address]



                                                    [place] [address]

 

                          Home Phone                [phone]

 

                          Work Phone                [phone]

 

                          Mobile Phone              [phone]

 

                          Email Address             [email or web address]

 

                          Preferred Language        English

 

                          Marital Status            Unknown

 

                          Pentecostalism Affiliation     Unknown

 

                          Race                      Unknown

 

                          Additional Race(s)        Unavailable



                                                    White

 

                          Ethnic Group              Unknown









Author







                          Organization              [facility]

t

 

                          Address                   [address]



                                                    [place] [address]

 

                          Phone                     [phone]









Support







                Name            Relationship    Address         Phone

 

                Unknown         Other           [phone]     [phone]



                                                [place] [address] 

 

                Juan Carlos         Mother          [phone]     [phone]



                                                [place] [address] 









Care Team Providers







                    Name                Role                Phone

 

                    Deloris ANDERS             Attending Clinician [phone]

 

                    Oswaldo NYE,  A    Attending Clinician Unavailable

 

                    Doctor Unassigned,  Name Attending Clinician Unavailable

 

                    Deloris ANDERS             Admitting Clinician [phone]









Problems

This patient has no known problems.



Allergies, Adverse Reactions, Alerts

This patient has no known allergies or adverse reactions.



Medications

This patient has no known medications.



Procedures

This patient has no known procedures.



Encounters







        Start   End     Encounter Admission Attending Care    Care    Encounter 

Source



        Date/Time Date/Time Type    Type    Clinicians Facility Department ID   

   

 

        2019 [facility]         [facility]    1.2.840.114 [id number]

[id number] 



        06:04:09 11:49:00 Encounter                 Health  350.1.13.10         



                                                [facility]   4.2.7.2.686         



                                                [facility]    [phone]         



                                                [facility] 049             



                                                ([facility])                   

 

        2019 Anesthesia         [facility]    [id number] 

[id number] 



        08:04:00 10:10:00                 [facility]  350.1.13.10         



                                                [facility]   4.2.7.2.686         



                                                [facility]    [phone]         



                                                [facility] 020             



                                                ([facility])                   

 

        2019 Orders          Doctor SARAVIA    1.2.840.114 [id number]

90 



        00:00:00 00:00:00 Only            UnassignedBONITA   350.1.13.10       

  



                                        [facility] 4.2.7.2.686         



                                                        [phone]         



                                                        009             

 

        2019 Orders          Doctor  RANI    1.2.840.114 [id number]

[id number] 



        00:00:00 00:00:00 Only            Unassigned, BONITA   350.1.13.10       

  



                                        [facility] 4.2.7.2.686         



                                                        [phone]         



                                                        009             

 

        2019 Office          [facility]    [id number] [id number]

[id number] 



        13:44:41 14:14:41 Visit                   Health  350.1.13.10         



                                                Clear   4.2.7.2.686         



                                                [facility]    [phone]         



                                                Medical [address]             



                                                Office                  



                                                Building                 







Results

This patient has no known results.

## 2021-09-12 VITALS — SYSTOLIC BLOOD PRESSURE: 120 MMHG | OXYGEN SATURATION: 100 % | DIASTOLIC BLOOD PRESSURE: 81 MMHG

## 2021-09-12 VITALS — TEMPERATURE: 97.8 F

## 2021-09-12 PROCEDURE — 0JQ10ZZ REPAIR FACE SUBCUTANEOUS TISSUE AND FASCIA, OPEN APPROACH: ICD-10-PCS
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
